# Patient Record
Sex: FEMALE | HISPANIC OR LATINO | URBAN - METROPOLITAN AREA
[De-identification: names, ages, dates, MRNs, and addresses within clinical notes are randomized per-mention and may not be internally consistent; named-entity substitution may affect disease eponyms.]

---

## 2018-08-21 NOTE — IMPRESSION/PLAN
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 Plan: Patient to taper PF  tid x 3 weeks then BID  x 3 weeks  and qd x 3 weeks Patient to get blood work ,  chest x ray and  PPD test.

RTC 3 weeks, please have Dr. Corrinne Oliva check [facility] before DE

## 2018-09-11 NOTE — IMPRESSION/PLAN
Impression: Type 2 diab w mild nonprlf diabetic rtnop w/o macular edema, bilateral: D06.1575.  Plan: Advised to obtain good blood glucose control

rtc 9 month DM OCT/MAC

## 2018-12-28 NOTE — IMPRESSION/PLAN
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 Plan: Discussed diagnosis in detail with patient. Discussed treatment options with patient. Patient to continue w/  Voriconazole q 1hr os, Tobramycin q 1hr os and Vancomycin q 1hr os. Natacyn eRX to pharmacy. Pt to try and get this ordered. Patient to come into clinic sunday am at 7 am, patient advised to come with someone.

## 2019-01-02 NOTE — IMPRESSION/PLAN
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 Appears fungal in nature. Improving on current Tx - Vision improved today. No sign of infiltration into AC or Perforation of cornea. Infiltrate still present w/ satalite, corneal edema present w/ DM folds and guttata. Plan: Continue voriconazole, tobramycin, vancomycin and ofloxacin Q1Hr. Pt to go to hospital/ER to receive treatment w/ fortified gtts. RTC 2 days w/ Dr Mony Kasper.

## 2019-01-04 NOTE — IMPRESSION/PLAN
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 Appears fungal in nature. Improving on current Tx - Vision improved today. No sign of infiltration into AC or Perforation of cornea. Infiltrate still present w/ satalite, corneal edema present w/ DM folds and guttata. Plan: fading infiltrate Continue voriconazole q 30 minutes, tobramycin q 2 hrs , vancomycin  q 2 hrs . Patient to see Christiano Abraham in the ER tomorrow  at 11:00 am

RTC Tuesday Dr. Starla Arellano

## 2019-01-08 NOTE — IMPRESSION/PLAN
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 Appears fungal in nature. Improving on current Tx - Vision improved today. No sign of infiltration into AC or Perforation of cornea. Infiltrate still present w/ satalite, corneal edema present w/ DM folds and guttata. Plan: fading infiltrate Continue voriconazole 9 times a  day, tobramycin qid OS, vancomycin  qid OS, natacyn 9 times a day OS, Prednisolone bid OS
and Start fluconazole 100mg tablet bid RTC Thursday morning

## 2019-01-10 NOTE — IMPRESSION/PLAN
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 Appears fungal in nature. Improving on current Tx - Vision improved today. No sign of infiltration into AC or Perforation of cornea. Infiltrate fading significantly and at much better rate than before. Plan: fading infiltrate Continue voriconazole 9 times a  day, tobramycin qid OS, vancomycin  qid OS, natacyn 9 times a day OS, Prednisolone bid OS
and Start fluconazole 100mg tablet bid Dr. Cami Banegas recommend patient not to go to [facility] if she decides not to go, patient to be seen Daniel morning at 7am but if patient decides not to come then Tuesday at the latest.


RTC Tuesday next week  Dr. Cami Banegas

## 2019-01-15 NOTE — IMPRESSION/PLAN
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 Appears fungal in nature. Improving on current Tx - Vision improved today. No sign of infiltration into AC or Perforation of cornea. Infiltrate fading significantly and at much better rate than before. Plan: fading infiltrate Continue voriconazole 8 times a  day, tobramycin bid OS, vancomycin  BID  OS, natacyn 8 times a day OS, Prednisolone bid OS
and Start fluconazole 100mg tablet bid po

RTC Thursday when Dr. James Correa and DR. Ad Rivers are in clinic, please print hospital report

## 2019-01-17 NOTE — IMPRESSION/PLAN
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 Appears fungal in nature. Improving on current Tx - Vision improved today. No sign of infiltration into AC or Perforation of cornea. Infiltrate fading slowly Plan: fading infiltrate Continue voriconazole 8 times a  day, tobramycin bid OS, vancomycin  BID  OS, Natacyn 8 times a day OS, Prednisolone bid OS
and continue fluconazole 100mg tablet bid po


RTC Tuesday F/up, Dr. Ang Alvarez

## 2019-01-22 NOTE — IMPRESSION/PLAN
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 Appears fungal in nature. Improving on current Tx - Vision improved today. No sign of infiltration into AC or Perforation of cornea. Infiltrate fading slowly Plan: fading infiltrate Continue
- voriconazole 6 times a  day OS
- tobramycin bid OS, 
- vancomycin  BID  OS, 
- Natacyn 12 times a day OS, 
- Prednisolone bid OS

when she runs out on Vamco and tobra to use OFL qid OS

RTC Friday Dr. Rojas 



 discontinue fluconazole 100mg tablet bid po


RTC Tuesday F/up, Dr. Rojas

## 2019-01-25 NOTE — IMPRESSION/PLAN
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 Appears fungal in nature. Improving on current Tx - Vision improved today. No sign of infiltration into AC or Perforation of cornea. Infiltrate fading slowly Plan: fading infiltrate Continue
- voriconazole 4 times a  day OS
- tobramycin QID OS, 
- vancomycin QID  OS, 
- Natacyn 12 times a day OS, 
- Prednisolone QID OS
- OFL qid OS

RTC Tuesday AM Dr. Sedonia Castleman If there is any new change in vision or new symptoms patient needs to be seen immediately. 



 discontinue fluconazole 100mg tablet bid po


RTC Tuesday F/up, Dr. Sedonia Castleman

## 2019-01-29 NOTE — IMPRESSION/PLAN
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 Improving slowly OS Plan: fading infiltrate Continue
- voriconazole 4 times a  day OS
- tobramycin QD OS, 
- vancomycin QD  OS, 
- Natacyn 12 times a day OS, 
- Prednisolone QID OS
- OFL qid OS

RTC Friday  Dr. Elena Norwood If there is any new change in vision or new symptoms patient needs to be seen immediately. 



 discontinue fluconazole 100mg tablet bid po


RTC Tuesday F/up, Dr. Elena Norwood

## 2019-01-31 NOTE — IMPRESSION/PLAN
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 Improving slowly OS Plan: fading infiltrate Discontinue tobramycin and vancomycin Continue
- voriconazole 6 times a  day OS
- Natacyn 12 times a day OS, 
- Prednisolone6 times a day  OS
- OFL 6 times a day OS

RTC 1 week follow up

## 2019-02-08 NOTE — IMPRESSION/PLAN
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 Improving slowly OS Plan: fading infiltrate Discontinue tobramycin and vancomycin Continue
- voriconazole 3 times a  day OS
- Natacyn 6  times a day OS, 
- Prednisolone 3 times a day  OS
- OFL 6 times a day OS
- preservative free artificial tears 6 times a day os (use on alternating hour) RTC Tuesday with Dr. Sravanthi Mckeon RTC 1 week follow up

## 2019-02-12 NOTE — IMPRESSION/PLAN
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 Patient had some improvement at one point but is now stagnant with a concern for recent thinning. Plan: Continue :
- voriconazole 6 times a  day OS
- Natacyn 12  times a day OS, 
- OFL 6 times a day OS Discontinue PF Discussed hospital Admission vs corneal consult RTC Friday with Dr. Millicent Pate - preservative free artificial tears 6 times a day os (use on alternating hour)

## 2019-02-19 NOTE — IMPRESSION/PLAN
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 Starting to improve Plan: Continue :
- voriconazole 6 times a day OS
- Natacyn 12  times a day OS till Friday 
- OFL 6 times a day OS Use on alternating hours. Discussed with patient that her present regimen is working very well.   

RTC 1 week

## 2019-02-26 NOTE — IMPRESSION/PLAN
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 persistent infiltrate Plan: Continue :
-Voriconazole 6  times a day OS
- Natacyn 6 times a day OS 
- Ofloxacin 2 time a day OS Use on alternating hours. Discussed with patient that her present regimen is working very well. Sending Patient to hospital to be admitted

## 2019-03-01 NOTE — IMPRESSION/PLAN
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 persistent infiltrate Plan: Continue :
-Voriconazole 3  times a day OS
- Natacyn 9 times a day OS 
- Ofloxacin 3 time a day OS
- PF tid OS Use on alternating hours. Discussed with patient that her present regimen is working very well.   

RTC Wednesday  Dr. Mosqueda Estimable

## 2022-02-25 NOTE — IMPRESSION/PLAN
Impression: Central corneal opacity of left eye: H17.12. Plan: Condition is stable, Will continue to monitor.

## 2022-02-25 NOTE — IMPRESSION/PLAN
Impression: Age-related cataract, morganian type, left eye: H25.22. Plan: Patient advised there is a cataract, and it is affecting their visual functioning. They may proceed with surgery. They were also advised that having cataract surgery does not mean they will not need to use glasses or contact lenses. Reviewed cataract surgery options with patient. They decline to consider options such as LensX, ORA, or upgraded lens. (Complex Cat OS. [place]. Standard.  Dexycu)

## 2022-02-25 NOTE — IMPRESSION/PLAN
Impression: Diabetes mellitus Type 2 without mention of complication: X05.9. Plan: No diabetic retinopathy is noted. Patient is advised that a yearly ophthalmic exam is recommended. Return sooner if any new symptoms.

## 2022-04-12 NOTE — IMPRESSION/PLAN
Impression: S/P Complex Cataract Extraction by phacoemulsification with IOL placement; Trypan Blue OS - 1 Day. Encounter for surgical aftercare following surgery on a sense organ  Z48.810. Plan: Doing well, continue to observe, call if any new problems.

## 2022-05-11 ENCOUNTER — POST-OPERATIVE VISIT (OUTPATIENT)
Dept: URBAN - METROPOLITAN AREA CLINIC 46 | Facility: CLINIC | Age: 67
End: 2022-05-11
Payer: COMMERCIAL

## 2022-05-11 DIAGNOSIS — Z48.810 ENCOUNTER FOR SURGICAL AFTERCARE FOLLOWING SURGERY ON A SENSE ORGAN: Primary | ICD-10-CM

## 2022-05-11 PROCEDURE — 99024 POSTOP FOLLOW-UP VISIT: CPT | Performed by: OPTOMETRIST

## 2022-05-11 RX ORDER — SODIUM CHLORIDE 50 MG/ML
5 % SOLUTION OPHTHALMIC
Qty: 5 | Refills: 0 | Status: INACTIVE
Start: 2022-05-11 | End: 2022-05-20

## 2022-05-11 ASSESSMENT — INTRAOCULAR PRESSURE
OD: 12
OS: 12

## 2022-05-11 NOTE — IMPRESSION/PLAN
Impression: S/P Complex Cataract Extraction by phacoemulsification with IOL placement; Trypan Blue OS - 30 Days. Encounter for surgical aftercare following surgery on a sense organ  Z48.810. Excellent post op course   Post operative instructions reviewed - Condition is improving - Cataract OD. Plan: Lens Clear & positioned well. Call if any sudden changes occur. --Begin Rebekah OS QID  --Advised patient to use artificial tears for comfort.

## 2022-05-31 ENCOUNTER — POST-OPERATIVE VISIT (OUTPATIENT)
Dept: URBAN - METROPOLITAN AREA CLINIC 46 | Facility: CLINIC | Age: 67
End: 2022-05-31
Payer: COMMERCIAL

## 2022-05-31 DIAGNOSIS — Z48.810 ENCOUNTER FOR SURGICAL AFTERCARE FOLLOWING SURGERY ON A SENSE ORGAN: Primary | ICD-10-CM

## 2022-05-31 PROCEDURE — 99024 POSTOP FOLLOW-UP VISIT: CPT | Performed by: OPTOMETRIST

## 2022-05-31 ASSESSMENT — INTRAOCULAR PRESSURE
OD: 12
OS: 13

## 2022-05-31 NOTE — IMPRESSION/PLAN
Impression: Encounter for surgical aftercare following surgery on a sense organ  Z48.810. Excellent post op course   Post operative instructions reviewed - Condition is improving - Cataract OD. Plan: Lens Clear & positioned well. Call if any sudden changes occur. --Advised patient to use artificial tears for comfort.

## 2022-08-16 NOTE — IMPRESSION/PLAN
Impression: Peripheral pterygium, stationary, left eye: H11.042. Plan: OS: Recommended to protect eyes from sun, dust wind (ultraviolet-blocking sunglasses or goggles if appropriate. Artificial tears 1 drop OU QID or more if irritation persist. New medication RX Maxitrol OS TID given today.

## 2022-08-16 NOTE — IMPRESSION/PLAN
Impression: Presence of intraocular lens: Z96.1. Plan: OS: Intraocular lens is in place, intact and clear. No haze visible at this point. Will continue to monitor.

## 2022-08-16 NOTE — IMPRESSION/PLAN
Impression: Keratoconjunctivitis sicca, bilateral: Z02.872. Plan: Dry eyes account for the patient's complaints. There is no evidence of permanent changes to the cornea. Explained condition does not have a cure and will need artificial tears for maintenance. Patient instructed to use artificial tears 4-6x/daily. Explained it may take time for eyes to acclimate completely to OTC regiment.

## 2022-08-16 NOTE — IMPRESSION/PLAN
Impression: Central corneal opacity of left eye: H17.12. Plan: OS: Condition is stable, Will continue to monitor.

## 2022-08-16 NOTE — IMPRESSION/PLAN
Impression: Age-related nuclear cataract, right eye: H25.11. Plan: OD: Patient advised there is a cataract, but that visual functioning is good. We will continue to monitor and they are advised to call or return if any new symptoms.

## 2022-08-16 NOTE — IMPRESSION/PLAN
Impression: Dermatochalasis of right upper lid: H02.831. Plan: Discussed diagnosis in detail with patient. No treatment is required at this time. Will continue to observe condition and or symptoms. Call if [address] worsens.

## 2022-09-07 NOTE — IMPRESSION/PLAN
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 Plan: patient was seen by [facility] [address] [place] [address] and it was recommended patient try RGP, patient declines. Recommended increase use of AT.

## 2022-09-07 NOTE — IMPRESSION/PLAN
Impression: Age-related nuclear cataract, right eye: H25.11. Plan: Patient advised there is a cataract, but that visual functioning is good. We will continue to monitor and they are advised to call or return if any new symptoms.

## 2022-09-07 NOTE — IMPRESSION/PLAN
Impression: Keratoconjunctivitis sicca, bilateral: F16.562. Plan: Dry eyes account for the patient's complaints. There is no evidence of permanent changes to the cornea. Explained condition does not have a cure and will need artificial tears for maintenance. Patient instructed to use artificial tears 4-6x/daily. Explained it may take time for eyes to acclimate completely to OTC regiment.

## 2022-09-07 NOTE — IMPRESSION/PLAN
Impression: Diabetes mellitus Type 2 without mention of complication: F14.4. Plan: Diabetes type II: No background retinopathy, no signs of neovascularization noted. Discussed ocular and systemic benefits of blood sugar control. Discussed risks of progression. Patient to call if signs are symptoms should arise.

## 2023-03-08 NOTE — IMPRESSION/PLAN
Impression: Central corneal opacity of left eye: H17.12. Plan: OS: Discussed diagnosis with patient. Discussed risks of progression. Patient to call if any signs and symptoms should arise. Will continue to monitor.

## 2023-03-08 NOTE — IMPRESSION/PLAN
Impression: Diabetes mellitus Type 2 without mention of complication: F67.6. Plan: Diabetes type II: No background retinopathy, no signs of neovascularization noted. Discussed ocular and systemic benefits of blood sugar control. Discussed risks of progression. Patient to call if signs are symptoms should arise.